# Patient Record
(demographics unavailable — no encounter records)

---

## 2024-11-01 NOTE — HISTORY OF PRESENT ILLNESS
[FreeTextEntry1] : f/u UC visit [de-identified] : went to UC 1 wk ago for possible allergic reaction to antibiotic (hives, lip pruritis--) UC notes reviewed 30 py tobacco d/c 8 yrs ago subtle hopkins since event but no other cardiac symps--caffeinates extensively neg cardiac workup 2022

## 2024-11-01 NOTE — ASSESSMENT
[FreeTextEntry1] : pt to re-see cardio if any conted hopkins or other cardiac symps ecg labs LDCT chest 1/25

## 2025-03-20 NOTE — HISTORY OF PRESENT ILLNESS
[No Pertinent Cardiac History] : no history of aortic stenosis, atrial fibrillation, coronary artery disease, recent myocardial infarction, or implantable device/pacemaker [No Pertinent Pulmonary History] : no history of asthma, COPD, sleep apnea, or smoking [(Patient denies any chest pain, claudication, dyspnea on exertion, orthopnea, palpitations or syncope)] : Patient denies any chest pain, claudication, dyspnea on exertion, orthopnea, palpitations or syncope [Good (7-10 METs)] : Good (7-10 METs) [FreeTextEntry1] : Cataract OU [FreeTextEntry2] : 3/28 and 4/14/25 [FreeTextEntry3] : Claudia

## 2025-05-05 NOTE — HISTORY OF PRESENT ILLNESS
[Former] : Former [>=20 Pack-Years] : Twenty pack years or greater smoking history: Yes [TextBox_13] : Mr. LINDQUIST is a 67 year old male.  Reviewed and confirmed that the patient meets screening eligibility criteria:   67 years old   Smoking Status: Former smoker   Number of pack(s) per day:  1 Number of years smoked: 30 Number of pack years smokin Quit year:    No symptoms of lung cancer, including new cough, change in cough, hemoptysis, and unintentional weight loss.   No personal history of lung cancer.  No lung cancer in a first degree relative.  No history of lung disease.  History of asbestos exposure. [YearQuit] : 2011 [PacksperYear] : 30